# Patient Record
Sex: MALE | Race: WHITE
[De-identification: names, ages, dates, MRNs, and addresses within clinical notes are randomized per-mention and may not be internally consistent; named-entity substitution may affect disease eponyms.]

---

## 2019-10-04 ENCOUNTER — HOSPITAL ENCOUNTER (EMERGENCY)
Dept: HOSPITAL 12 - ER | Age: 24
Discharge: HOME | End: 2019-10-04
Payer: SELF-PAY

## 2019-10-04 VITALS — BODY MASS INDEX: 21.47 KG/M2 | WEIGHT: 150 LBS | HEIGHT: 70 IN
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
## 2019-10-04 DIAGNOSIS — S60.512A: ICD-10-CM
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
## 2019-10-04 DIAGNOSIS — S80.212A: ICD-10-CM

## 2019-10-04 DIAGNOSIS — M54.2: ICD-10-CM

## 2019-10-04 DIAGNOSIS — S62.014A: Primary | ICD-10-CM
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
## 2019-10-04 PROCEDURE — 90715 TDAP VACCINE 7 YRS/> IM: CPT

## 2019-10-04 PROCEDURE — 29125 APPL SHORT ARM SPLINT STATIC: CPT

## 2019-10-04 PROCEDURE — 72040 X-RAY EXAM NECK SPINE 2-3 VW: CPT

## 2019-10-04 PROCEDURE — 73130 X-RAY EXAM OF HAND: CPT

## 2019-10-04 PROCEDURE — A4663 DIALYSIS BLOOD PRESSURE CUFF: HCPCS

## 2019-10-04 PROCEDURE — A4217 STERILE WATER/SALINE, 500 ML: HCPCS

## 2019-10-04 PROCEDURE — 72100 X-RAY EXAM L-S SPINE 2/3 VWS: CPT

## 2019-10-04 PROCEDURE — 99283 EMERGENCY DEPT VISIT LOW MDM: CPT

## 2019-10-04 PROCEDURE — 73560 X-RAY EXAM OF KNEE 1 OR 2: CPT

## 2019-10-04 PROCEDURE — 90471 IMMUNIZATION ADMIN: CPT

## 2019-10-04 NOTE — NUR
HAND OFF RECEIVED FROM OUTGOIBNG DAY SHIFT RN



PT IS AOX3, ABLE TO SPEAK CLEAR , COMPLETE SENTENCES,

DENIES LOC



PT IS ON SUPINE, AT SEMIFOWLER'S POSITION

[id number] UP, BED AT LOWEST POSITION